# Patient Record
Sex: FEMALE | Race: WHITE
[De-identification: names, ages, dates, MRNs, and addresses within clinical notes are randomized per-mention and may not be internally consistent; named-entity substitution may affect disease eponyms.]

---

## 2017-01-24 ENCOUNTER — HOSPITAL ENCOUNTER (OUTPATIENT)
Dept: HOSPITAL 75 - RAD | Age: 72
End: 2017-01-24
Attending: FAMILY MEDICINE
Payer: MEDICARE

## 2017-01-24 DIAGNOSIS — Z12.31: Primary | ICD-10-CM

## 2017-01-24 NOTE — DIAGNOSTIC IMAGING REPORT
Digital mammogram bilateral screening.



INDICATION: Screening.



This study was compared to the prior exams of 01/28/2015,

01/17/2014, and 01/07/2013.



At this time, there are no current complaints.



The current study was also evaluated with a Computer Aided

Detection (CAD) system.



FINDINGS: The fibroglandular tissue in both breasts is

heterogeneously dense. This does limit the sensitivity of this

exam. On the MLO view of the right breast in the superior aspect

of the breast just anterior to the pectoralis muscle and roughly

13 cm from the nipple, there is a small group of faint

microcalcifications. These calcific densities were not clearly

evident on the prior exam, nor can they be identified on the

craniocaudad view of this study. I suspect that these

calcifications may be in the far lateral aspect of the right

breast. I would recommend that a compression/magnification view

of this area be obtained in the MLO projection for further

study. An XCC view should also be performed, and if the

calcifications in question can be identified in that projection,

they should be compressed and magnified as well.



The overall appearance of the breasts has not changed

significantly otherwise. There is no primary or secondary sign

of malignancy noted.



IMPRESSION:



Additional mammographic views of the right breast would be

recommended for further study.



ACR BI-RADS Category 0: Incomplete. (Needs additional imaging

evaluation).

Result letter will be mailed to the patient.

Note: At least 10% of breast cancer is not imaged by mammography.



Dictated by:



Dictated on workstation # [id number]

## 2017-02-08 ENCOUNTER — HOSPITAL ENCOUNTER (OUTPATIENT)
Dept: HOSPITAL 75 - RAD | Age: 72
End: 2017-02-08
Attending: NURSE PRACTITIONER
Payer: MEDICARE

## 2017-02-08 DIAGNOSIS — R92.8: Primary | ICD-10-CM

## 2017-02-08 NOTE — DIAGNOSTIC IMAGING REPORT
EXAMINATION: Right breast digital diagnostic mammogram with CAD.



The current study was also evaluated with a Computer Aided

Detection (CAD) system.



INDICATION: Asymmetry along the upper aspect of the right MLO

view.



FINDINGS: The asymmetry marked on the previous screening study

on further evaluation appears to correlate with a mole and a

mole marker was placed on the focal compression view that

matches the area of asymmetry and calcifications compatible with

a skin lesion with no true breast abnormality in the parenchyma.



IMPRESSION: Asymmetry with calcifications in the upper aspect of

the right MLO view, which corresponds to a mole on the skin. No

breast parenchymal lesion is identified. Annual screening

mammogram is recommended.



ACR BI-RADS Category 2: Benign findings.

Result letter will be mailed to the patient.

Note: At least 10% of breast cancer is not imaged by mammography.



Dictated by:



Dictated on workstation # [id number]

## 2019-02-19 ENCOUNTER — HOSPITAL ENCOUNTER (OUTPATIENT)
Dept: HOSPITAL 75 - CARD | Age: 74
End: 2019-02-19
Attending: INTERNAL MEDICINE
Payer: MEDICARE

## 2019-02-19 DIAGNOSIS — R07.89: Primary | ICD-10-CM

## 2019-02-19 DIAGNOSIS — Z82.49: ICD-10-CM

## 2019-02-19 DIAGNOSIS — E78.5: ICD-10-CM

## 2019-02-19 PROCEDURE — 93306 TTE W/DOPPLER COMPLETE: CPT

## 2019-02-26 ENCOUNTER — HOSPITAL ENCOUNTER (OUTPATIENT)
Dept: HOSPITAL 75 - CARD | Age: 74
End: 2019-02-26
Attending: NURSE PRACTITIONER
Payer: MEDICARE

## 2019-02-26 VITALS — DIASTOLIC BLOOD PRESSURE: 80 MMHG | SYSTOLIC BLOOD PRESSURE: 141 MMHG

## 2019-02-26 VITALS — SYSTOLIC BLOOD PRESSURE: 151 MMHG | DIASTOLIC BLOOD PRESSURE: 88 MMHG

## 2019-02-26 DIAGNOSIS — Z82.49: ICD-10-CM

## 2019-02-26 DIAGNOSIS — I10: ICD-10-CM

## 2019-02-26 DIAGNOSIS — E78.5: ICD-10-CM

## 2019-02-26 DIAGNOSIS — R07.9: Primary | ICD-10-CM

## 2019-02-26 PROCEDURE — 93017 CV STRESS TEST TRACING ONLY: CPT

## 2019-02-26 PROCEDURE — 78452 HT MUSCLE IMAGE SPECT MULT: CPT

## 2019-02-26 NOTE — STRESS TEST
DATE OF SERVICE:  02/26/2019



RESTING AND POST REGADENOSON TECHNETIUM-99M TETROFOSMIN SPECT CT IMAGING



ORDERING PHYSICIAN:

GOVIND Smiley.



PRIMARY PHYSICIAN:

Dr. Goss.



CLINICAL DIAGNOSIS:

Chest discomfort.



Baseline images were carried out after injection of 10.87 mCi of technetium-99m

Tetrofosmin.  This was followed by 0.4 mg of Regadenoson and 30.7 mCi of

technetium-99m Tetrofosmin for stress imaging.  The electrocardiogram showed

sinus rhythm at baseline.  It did not change significantly with the Regadenoson

infusion.  The patient noted some lightheadedness following Regadenoson

infusion, which resolved in a few minutes.  Overall, she tolerated the procedure

well.  Review of images at rest and following stress does not indicate any

distinct perfusion defect consistent with significant myocardial ischemia or

infarction.  Gated images show normal global left ventricular systolic function

with normal regional wall motion.  Left ventricular ejection fraction is

calculated to be 59%.  Left ventricular end diastolic volume is 44 mL.  TID is

absent (1.16).



CONCLUSIONS:

1.  No evidence of any significant myocardial ischemia or infarction on this

study.

2.  Normal regional wall motion.

3.  Normal global left ventricular systolic function with a calculated ejection

fraction of 59%.





Job ID: [id number]

DocumentID: [id number]

Dictated Date:  02/26/2019 12:55:02

Transcription Date: 02/26/2019 15:04:25

Dictated By: RODNEY CAMARENA MD, MA, FACP, FACC,

## 2020-09-16 ENCOUNTER — APPOINTMENT (RX ONLY)
Dept: URBAN - METROPOLITAN AREA CLINIC 51 | Facility: CLINIC | Age: 75
Setting detail: DERMATOLOGY
End: 2020-09-16

## 2020-09-16 DIAGNOSIS — L73.8 OTHER SPECIFIED FOLLICULAR DISORDERS: ICD-10-CM

## 2020-09-16 DIAGNOSIS — L82.1 OTHER SEBORRHEIC KERATOSIS: ICD-10-CM

## 2020-09-16 DIAGNOSIS — D22 MELANOCYTIC NEVI: ICD-10-CM

## 2020-09-16 DIAGNOSIS — D18.0 HEMANGIOMA: ICD-10-CM

## 2020-09-16 PROBLEM — D22.5 MELANOCYTIC NEVI OF TRUNK: Status: ACTIVE | Noted: 2020-09-16

## 2020-09-16 PROBLEM — D18.01 HEMANGIOMA OF SKIN AND SUBCUTANEOUS TISSUE: Status: ACTIVE | Noted: 2020-09-16

## 2020-09-16 PROCEDURE — 99202 OFFICE O/P NEW SF 15 MIN: CPT | Mod: 25

## 2020-09-16 PROCEDURE — ? LIQUID NITROGEN

## 2020-09-16 PROCEDURE — ? COUNSELING

## 2020-09-16 PROCEDURE — 17110 DESTRUCTION B9 LES UP TO 14: CPT

## 2020-09-16 ASSESSMENT — LOCATION DETAILED DESCRIPTION DERM
LOCATION DETAILED: LEFT ANTERIOR PROXIMAL UPPER ARM
LOCATION DETAILED: LEFT INFERIOR LATERAL LOWER BACK
LOCATION DETAILED: LEFT INFERIOR ANTERIOR NECK
LOCATION DETAILED: RIGHT FOREHEAD
LOCATION DETAILED: RIGHT MEDIAL FOREHEAD
LOCATION DETAILED: EPIGASTRIC SKIN
LOCATION DETAILED: LEFT FOREHEAD
LOCATION DETAILED: RIGHT INFERIOR MEDIAL MIDBACK
LOCATION DETAILED: RIGHT ANTERIOR PROXIMAL UPPER ARM
LOCATION DETAILED: LEFT RIB CAGE
LOCATION DETAILED: RIGHT SUPERIOR MEDIAL UPPER BACK
LOCATION DETAILED: LEFT INFERIOR CENTRAL MALAR CHEEK
LOCATION DETAILED: INFERIOR THORACIC SPINE

## 2020-09-16 ASSESSMENT — LOCATION ZONE DERM
LOCATION ZONE: TRUNK
LOCATION ZONE: NECK
LOCATION ZONE: ARM
LOCATION ZONE: FACE

## 2020-09-16 ASSESSMENT — LOCATION SIMPLE DESCRIPTION DERM
LOCATION SIMPLE: UPPER BACK
LOCATION SIMPLE: LEFT CHEEK
LOCATION SIMPLE: RIGHT LOWER BACK
LOCATION SIMPLE: LEFT UPPER ARM
LOCATION SIMPLE: RIGHT UPPER ARM
LOCATION SIMPLE: LEFT LOWER BACK
LOCATION SIMPLE: LEFT ANTERIOR NECK
LOCATION SIMPLE: RIGHT FOREHEAD
LOCATION SIMPLE: ABDOMEN
LOCATION SIMPLE: LEFT FOREHEAD
LOCATION SIMPLE: RIGHT UPPER BACK

## 2020-09-16 NOTE — PROCEDURE: LIQUID NITROGEN
Consent: The patient's  verbal consent was obtained including but not limited to risks of crusting, scabbing, blistering, scarring, darker or lighter pigmentary change, recurrence, incomplete removal and infection.
Medical Necessity Information: It is in your best interest to select a reason for this procedure from the list below. All of these items fulfill various CMS LCD requirements except the new and changing color options.
Post-Care Instructions: I reviewed with the patient in detail post-care instructions. Patient is to wear sunprotection, and avoid picking at any of the treated lesions. Pt may apply Vaseline to crusted or scabbing areas.
Detail Level: Zone
Bill Insurance (You Assume Risk Of Denial Or Audit By Selecting Yes): Yes
Render Post-Care Instructions In Note?: no
Duration Of Freeze Thaw-Cycle (Seconds): 5
Number Of Freeze-Thaw Cycles: 5 freeze-thaw cycles

## 2021-09-15 ENCOUNTER — APPOINTMENT (RX ONLY)
Dept: URBAN - METROPOLITAN AREA CLINIC 51 | Facility: CLINIC | Age: 76
Setting detail: DERMATOLOGY
End: 2021-09-15

## 2021-09-15 DIAGNOSIS — L70.8 OTHER ACNE: ICD-10-CM | Status: STABLE

## 2021-09-15 DIAGNOSIS — L81.3 CAFÉ AU LAIT SPOTS: ICD-10-CM | Status: STABLE

## 2021-09-15 DIAGNOSIS — D18.0 HEMANGIOMA: ICD-10-CM | Status: STABLE

## 2021-09-15 DIAGNOSIS — L81.4 OTHER MELANIN HYPERPIGMENTATION: ICD-10-CM | Status: STABLE

## 2021-09-15 DIAGNOSIS — L82.1 OTHER SEBORRHEIC KERATOSIS: ICD-10-CM | Status: STABLE

## 2021-09-15 DIAGNOSIS — D22 MELANOCYTIC NEVI: ICD-10-CM | Status: STABLE

## 2021-09-15 PROBLEM — D18.01 HEMANGIOMA OF SKIN AND SUBCUTANEOUS TISSUE: Status: ACTIVE | Noted: 2021-09-15

## 2021-09-15 PROBLEM — D22.5 MELANOCYTIC NEVI OF TRUNK: Status: ACTIVE | Noted: 2021-09-15

## 2021-09-15 PROCEDURE — ? COUNSELING

## 2021-09-15 PROCEDURE — 99213 OFFICE O/P EST LOW 20 MIN: CPT

## 2021-09-15 ASSESSMENT — LOCATION DETAILED DESCRIPTION DERM
LOCATION DETAILED: LEFT PROXIMAL PRETIBIAL REGION
LOCATION DETAILED: RIGHT PROXIMAL CALF
LOCATION DETAILED: RIGHT ANTERIOR PROXIMAL UPPER ARM
LOCATION DETAILED: EPIGASTRIC SKIN
LOCATION DETAILED: LEFT ANTERIOR DISTAL UPPER ARM
LOCATION DETAILED: LEFT POSTERIOR EAR
LOCATION DETAILED: LEFT INFERIOR LATERAL LOWER BACK
LOCATION DETAILED: RIGHT PROXIMAL PRETIBIAL REGION
LOCATION DETAILED: SUPERIOR THORACIC SPINE
LOCATION DETAILED: LEFT INFERIOR CENTRAL MALAR CHEEK
LOCATION DETAILED: RIGHT INFERIOR CENTRAL MALAR CHEEK

## 2021-09-15 ASSESSMENT — LOCATION SIMPLE DESCRIPTION DERM
LOCATION SIMPLE: RIGHT UPPER ARM
LOCATION SIMPLE: UPPER BACK
LOCATION SIMPLE: RIGHT CHEEK
LOCATION SIMPLE: LEFT UPPER ARM
LOCATION SIMPLE: RIGHT PRETIBIAL REGION
LOCATION SIMPLE: ABDOMEN
LOCATION SIMPLE: LEFT LOWER BACK
LOCATION SIMPLE: LEFT EAR
LOCATION SIMPLE: RIGHT CALF
LOCATION SIMPLE: LEFT CHEEK
LOCATION SIMPLE: LEFT PRETIBIAL REGION

## 2021-09-15 ASSESSMENT — LOCATION ZONE DERM
LOCATION ZONE: FACE
LOCATION ZONE: LEG
LOCATION ZONE: ARM
LOCATION ZONE: EAR
LOCATION ZONE: TRUNK

## 2022-09-15 ENCOUNTER — APPOINTMENT (RX ONLY)
Dept: URBAN - METROPOLITAN AREA CLINIC 179 | Facility: CLINIC | Age: 77
Setting detail: DERMATOLOGY
End: 2022-09-15

## 2022-09-15 DIAGNOSIS — L81.3 CAFÉ AU LAIT SPOTS: ICD-10-CM | Status: STABLE

## 2022-09-15 DIAGNOSIS — D18.0 HEMANGIOMA: ICD-10-CM | Status: STABLE

## 2022-09-15 DIAGNOSIS — L81.4 OTHER MELANIN HYPERPIGMENTATION: ICD-10-CM | Status: STABLE

## 2022-09-15 DIAGNOSIS — L73.8 OTHER SPECIFIED FOLLICULAR DISORDERS: ICD-10-CM | Status: STABLE

## 2022-09-15 DIAGNOSIS — L90.0 LICHEN SCLEROSUS ET ATROPHICUS: ICD-10-CM | Status: WELL CONTROLLED

## 2022-09-15 DIAGNOSIS — L70.8 OTHER ACNE: ICD-10-CM | Status: WORSENING

## 2022-09-15 DIAGNOSIS — L57.0 ACTINIC KERATOSIS: ICD-10-CM | Status: WORSENING

## 2022-09-15 DIAGNOSIS — L82.1 OTHER SEBORRHEIC KERATOSIS: ICD-10-CM | Status: STABLE

## 2022-09-15 DIAGNOSIS — L72.8 OTHER FOLLICULAR CYSTS OF THE SKIN AND SUBCUTANEOUS TISSUE: ICD-10-CM | Status: STABLE

## 2022-09-15 DIAGNOSIS — D22 MELANOCYTIC NEVI: ICD-10-CM | Status: STABLE

## 2022-09-15 PROBLEM — D22.5 MELANOCYTIC NEVI OF TRUNK: Status: ACTIVE | Noted: 2022-09-15

## 2022-09-15 PROBLEM — D18.01 HEMANGIOMA OF SKIN AND SUBCUTANEOUS TISSUE: Status: ACTIVE | Noted: 2022-09-15

## 2022-09-15 PROCEDURE — ? COUNSELING

## 2022-09-15 PROCEDURE — ? PRESCRIPTION

## 2022-09-15 PROCEDURE — 99214 OFFICE O/P EST MOD 30 MIN: CPT

## 2022-09-15 PROCEDURE — ? TREATMENT REGIMEN

## 2022-09-15 RX ORDER — CLOBETASOL PROPIONATE 0.5 MG/G
CREAM TOPICAL
Qty: 60 | Refills: 3 | Status: ERX | COMMUNITY
Start: 2022-09-15

## 2022-09-15 RX ORDER — PIMECROLIMUS 10 MG/G
CREAM TOPICAL BID
Qty: 60 | Refills: 3 | Status: ERX | COMMUNITY
Start: 2022-09-15

## 2022-09-15 RX ADMIN — CLOBETASOL PROPIONATE: 0.5 CREAM TOPICAL at 00:00

## 2022-09-15 RX ADMIN — PIMECROLIMUS: 10 CREAM TOPICAL at 00:00

## 2022-09-15 ASSESSMENT — LOCATION DETAILED DESCRIPTION DERM
LOCATION DETAILED: RIGHT INFERIOR CENTRAL MALAR CHEEK
LOCATION DETAILED: RIGHT FOREHEAD
LOCATION DETAILED: LEFT LATERAL INFERIOR EYELID
LOCATION DETAILED: LEFT LATERAL SUPERIOR CHEST
LOCATION DETAILED: LEFT SUPERIOR LATERAL LOWER BACK
LOCATION DETAILED: LEFT FOREHEAD
LOCATION DETAILED: LEFT PROXIMAL DORSAL FOREARM
LOCATION DETAILED: LEFT MID-UPPER BACK
LOCATION DETAILED: LEFT INFERIOR LATERAL NECK
LOCATION DETAILED: RIGHT RIB CAGE
LOCATION DETAILED: RIGHT PROXIMAL DORSAL FOREARM
LOCATION DETAILED: LEFT INFERIOR UPPER BACK
LOCATION DETAILED: LEFT CENTRAL MALAR CHEEK
LOCATION DETAILED: RIGHT SUPERIOR CENTRAL MALAR CHEEK
LOCATION DETAILED: RIGHT LABIA MAJORA
LOCATION DETAILED: LEFT ANTERIOR PROXIMAL UPPER ARM
LOCATION DETAILED: LEFT SUPERIOR MEDIAL UPPER BACK
LOCATION DETAILED: LEFT INFRAMAMMARY CREASE (INNER QUADRANT)
LOCATION DETAILED: PERIANAL SKIN
LOCATION DETAILED: NASAL DORSUM
LOCATION DETAILED: LEFT INFERIOR VERMILION LIP
LOCATION DETAILED: RIGHT PROXIMAL CALF
LOCATION DETAILED: LEFT BUTTOCK
LOCATION DETAILED: RIGHT BUTTOCK
LOCATION DETAILED: RIGHT SUPERIOR UPPER BACK
LOCATION DETAILED: RIGHT INFERIOR LATERAL MIDBACK
LOCATION DETAILED: RIGHT INFERIOR UPPER BACK
LOCATION DETAILED: RIGHT MID-UPPER BACK

## 2022-09-15 ASSESSMENT — LOCATION SIMPLE DESCRIPTION DERM
LOCATION SIMPLE: VULVA
LOCATION SIMPLE: RIGHT FOREARM
LOCATION SIMPLE: LEFT BREAST
LOCATION SIMPLE: RIGHT CHEEK
LOCATION SIMPLE: RIGHT CALF
LOCATION SIMPLE: LEFT ANTERIOR NECK
LOCATION SIMPLE: LEFT INFERIOR EYELID
LOCATION SIMPLE: PERIANAL SKIN
LOCATION SIMPLE: NOSE
LOCATION SIMPLE: BUTTOCK
LOCATION SIMPLE: LEFT FOREHEAD
LOCATION SIMPLE: LEFT INFERIOR LIP
LOCATION SIMPLE: RIGHT UPPER BACK
LOCATION SIMPLE: LEFT FOREARM
LOCATION SIMPLE: ABDOMEN
LOCATION SIMPLE: RIGHT LOWER BACK
LOCATION SIMPLE: RIGHT FOREHEAD
LOCATION SIMPLE: LEFT UPPER BACK
LOCATION SIMPLE: CHEST
LOCATION SIMPLE: LEFT LOWER BACK
LOCATION SIMPLE: LEFT CHEEK
LOCATION SIMPLE: LEFT UPPER ARM

## 2022-09-15 ASSESSMENT — LOCATION ZONE DERM
LOCATION ZONE: NECK
LOCATION ZONE: FACE
LOCATION ZONE: ANUS
LOCATION ZONE: TRUNK
LOCATION ZONE: NOSE
LOCATION ZONE: LEG
LOCATION ZONE: VULVA
LOCATION ZONE: EYELID
LOCATION ZONE: ARM
LOCATION ZONE: LIP

## 2022-09-15 NOTE — HPI: RASH (LICHEN SCLEROSUS ET ATROPHICUS)
How Severe Is It?: mild
Is This A New Presentation, Or A Follow-Up?: Rash
Additional History: Patient states she believes it is spreading to her rectal area. 6 month skin check as well.

## 2022-09-15 NOTE — PROCEDURE: TREATMENT REGIMEN
Detail Level: Zone
Plan: Treat with Eurex every other day for two weeks. Patient states  has prescription at home.
Otc Regimen: Vaseline twice daily

## 2023-06-02 ENCOUNTER — HOSPITAL ENCOUNTER (OUTPATIENT)
Dept: HOSPITAL 75 - CARD | Age: 78
End: 2023-06-02
Attending: INTERNAL MEDICINE
Payer: MEDICARE

## 2023-06-02 DIAGNOSIS — I25.10: ICD-10-CM

## 2023-06-02 DIAGNOSIS — I08.1: Primary | ICD-10-CM

## 2023-06-02 PROCEDURE — 93306 TTE W/DOPPLER COMPLETE: CPT

## 2023-08-01 ENCOUNTER — HOSPITAL ENCOUNTER (OUTPATIENT)
Dept: HOSPITAL 75 - CARD | Age: 78
End: 2023-08-01
Attending: INTERNAL MEDICINE
Payer: MEDICARE

## 2023-08-01 VITALS — SYSTOLIC BLOOD PRESSURE: 204 MMHG | DIASTOLIC BLOOD PRESSURE: 90 MMHG

## 2023-08-01 DIAGNOSIS — I25.10: Primary | ICD-10-CM

## 2023-08-01 PROCEDURE — 78452 HT MUSCLE IMAGE SPECT MULT: CPT

## 2023-08-01 PROCEDURE — 93017 CV STRESS TEST TRACING ONLY: CPT

## 2023-08-02 NOTE — STRESS TEST
DATE OF SERVICE: 08/01/2023



RESTING AND POST REGADENOSON TECHNETIUM-99M TETROFOSMIN SPECT CT IMAGING



ORDERING PHYSICIAN:

Dr. Ding.



PRIMARY PHYSICIAN:

Dr. Goss.



CLINICAL DIAGNOSIS:

Coronary artery disease.



Baseline images were carried out after injection of 10.93 mCi of technetium-99m 

tetrofosmin.  This was followed by 0.4 mg regadenoson and 31.8 mCi of 

technetium-99m tetrofosmin for stress imaging.  The electrocardiogram did not 

change significantly with regadenoson infusion.  The patient noted some 

lightheadedness after regadenoson infusion, which resolved in a few minutes.  

Review of images at rest and following stress does not indicate any perfusion 

defects consistent with significant myocardial ischemia or infarction.  Gated 

images show normal global left ventricular systolic function with normal 

regional wall motion.  Left ventricular ejection fraction is calculated to be 

61%.



CONCLUSIONS:

1.  No evidence of any significant myocardial ischemia or infarction on the 

study.

2.  Normal regional wall motion.

3.  Normal global left ventricular systolic function with a calculated ejection 

fraction of 61%.





Job ID: [id number]

DocumentID: [id number]

Dictated Date: 08/02/2023 13:05:04

Transcription Date: 08/02/2023 14:31:00

Dictated By: RODNEY DIGN MD; MA; FACP; FACC;